# Patient Record
(demographics unavailable — no encounter records)

---

## 2025-04-17 NOTE — HISTORY OF PRESENT ILLNESS
[Rectal Prolapse] : no [Urinary Frequency] : no [x2] : nocturia two times a night [] : no [de-identified] : sometimes [de-identified] : sometimes [de-identified] : daily  [de-identified] : sometimes [de-identified] : stops drinking at around 8PM [de-identified] : changes underwear [de-identified] : not sexually active

## 2025-04-17 NOTE — DISCUSSION/SUMMARY
[FreeTextEntry1] : I reviewed the above findings with the patient.  She has mixed urinary incontinence and reports that her OAB symptoms are the most bothersome.  We discussed first line therapies, including Kegel's exercises and behavioral modification. We discussed medications. We also discussed SNM, Botox, and PTNS.  Risk and benefits of the treatment options were discussed We also discussed surgical and nonsurgical treatment options for the stress urinary incontinence. She would like to start with behavioral modification techniques and physical therapy.  [facility]patient information on overactive bladder, stress incontinence, and the UFU pathway for OAB was given to her.  We discussed following up in the office in approximately 3 months.  All questions were answered.

## 2025-04-17 NOTE — HISTORY OF PRESENT ILLNESS
[Rectal Prolapse] : no [Urinary Frequency] : no [x2] : nocturia two times a night [] : no [de-identified] : sometimes [de-identified] : sometimes [de-identified] : daily  [de-identified] : sometimes [de-identified] : stops drinking at around 8PM [de-identified] : changes underwear [de-identified] : not sexually active

## 2025-04-17 NOTE — PHYSICAL EXAM
[MA] : MA [Well developed] : well developed [Well Nourished] : ~L well nourished [Good Hygeine] : demonstrates good hygeine [Normal Mood/Affect] : mood and affect are normal [Warm and Dry] : was warm and dry to touch [Normal Appearance] : general appearance was normal [Normal] : normal [2] : 2 [Post Void Residual ____ml] : post void residual was [unfilled] ml [FreeTextEntry2] : Kenisha [Anxiety] : patient is not anxious [Tenderness] : ~T no ~M abdominal tenderness observed [Distended] : not distended

## 2025-04-23 NOTE — REASON FOR VISIT
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  She had pain here in 2020.  She had a MDP and an injection.  There is a known high-grade right rotator cuff tear.  She has seen Dr. Roberts for this previously.  She can't reach or sleep.  Tylenol and ice have not helped.

## 2025-04-23 NOTE — ASSESSMENT
[FreeTextEntry1] : ___ We discussed the underlying pathology. This is chronic with exacerbation.  Treatment options reviewed. A Medrol dose pack is prescribed, dispense 1, to be taken as directed, with risks of GI symptoms reviewed. She will follow up in 1-2 weeks with repeat xrays - AP & Zanca Ice use discussed.  Cautions discussed. Questions answered.  Patient seen by Dr. Jaison Roberts, who determined the assessment and plan. Lindsay DOW participated in the care of the patient, including the history and physical exam. Ivette PECK, am scribing for Dr. Jaison Roberts in his presence for the chief complaint, physical exam, studies, assessment, and/or plan.

## 2025-04-23 NOTE — PHYSICAL EXAM
[Left] : left shoulder [Sitting] : sitting [Severe] : severe [Moderate] : moderate [] : no sensory deficits [de-identified] : Strength is not stressed. [TWNoteComboBox4] : passive forward flexion 135 degrees [TWNoteComboBox6] : internal rotation L4 [de-identified] : external rotation 45 degrees

## 2025-04-23 NOTE — DATA REVIEWED
[FreeTextEntry1] : SHOULDER XR LEFT (AP/Outlet) taken and reviewed on 4/23/25: The clinically significant findings include no GH changes.  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  There is a lateral calcium with signs of rupture.  This is larger than the findings on the 2020 XRs.  SCAPULA XR LEFT (Axillary/Zanca) taken and reviewed on 4/23/25: The clinically significant findings include a Type II acromion.

## 2025-05-05 NOTE — HISTORY OF PRESENT ILLNESS
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

## 2025-05-05 NOTE — ASSESSMENT
[FreeTextEntry1] : ___ We discussed her course.  We discussed calcium and its typical course. Naproxen 440mg, q12h for 10-14 days is prescribed, with risk of GI symptoms reviewed. Physical Therapy is prescribed, 2x/week for 4-6 weeks. Questions answered.   Patient seen by Dr. Jaison Roberts, who determined the assessment and plan. Lindsay DOW participated in the care of the patient, including the history and physical exam. Ivette PECK, am scribing for Dr. Jaison Roberts in his presence for the chief complaint, physical exam, studies, assessment, and/or plan.

## 2025-05-05 NOTE — REASON FOR VISIT
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  She had pain here in 2020.  She had a MDP and an injection.  There is a known high-grade right rotator cuff tear.  She has seen Dr. Roberts for this previously.  She can't reach or sleep.  Tylenol and ice have not helped. On 5/5/25, she returns for revaluation of her left shoulder pain.  The MDP helped a great deal, and ice was helpful.  There is posterior soreness.  Overall, she feels near complete resolution of her pain.

## 2025-05-05 NOTE — PHYSICAL EXAM
[Left] : left shoulder [Sitting] : sitting [Trace] : trace [5 ___] : forward flexion 5[unfilled]/5 [5___] : external rotation 5[unfilled]/5 [] : no sensory deficits [FreeTextEntry8] : There is medial scapular boarder tenderness.  [FreeTextEntry9] : IR to T10. [de-identified] : This is slight. [TWNoteComboBox4] : passive forward flexion 165 degrees [TWNoteComboBox6] : internal rotation L4 [de-identified] : external rotation 75 degrees

## 2025-05-05 NOTE — DATA REVIEWED
[FreeTextEntry1] : SHOULDER XR LEFT (AP/Outlet) taken and reviewed on 5/5/25: The clinically significant findings include near complete resolution of the lateral calcium.  SHOULDER XR LEFT (AP/Outlet) taken and reviewed on 4/23/25: The clinically significant findings include no GH changes.  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  There is a lateral calcium with signs of rupture.  This is larger than the findings on the 2020 XRs.  SCAPULA XR LEFT (Axillary/Zanca) taken and reviewed on 4/23/25: The clinically significant findings include a Type II acromion.